# Patient Record
Sex: MALE | URBAN - METROPOLITAN AREA
[De-identification: names, ages, dates, MRNs, and addresses within clinical notes are randomized per-mention and may not be internally consistent; named-entity substitution may affect disease eponyms.]

---

## 2023-01-11 ENCOUNTER — LAB REQUISITION (OUTPATIENT)
Dept: LAB | Facility: CLINIC | Age: 48
End: 2023-01-11
Payer: COMMERCIAL

## 2023-01-11 DIAGNOSIS — I10 ESSENTIAL (PRIMARY) HYPERTENSION: ICD-10-CM

## 2023-01-11 DIAGNOSIS — Z00.00 ENCOUNTER FOR GENERAL ADULT MEDICAL EXAMINATION WITHOUT ABNORMAL FINDINGS: ICD-10-CM

## 2023-01-11 LAB
ALBUMIN SERPL BCG-MCNC: 4.5 G/DL (ref 3.5–5.2)
ALP SERPL-CCNC: 108 U/L (ref 40–129)
ALT SERPL W P-5'-P-CCNC: 33 U/L (ref 10–50)
ANION GAP SERPL CALCULATED.3IONS-SCNC: 18 MMOL/L (ref 7–15)
AST SERPL W P-5'-P-CCNC: 27 U/L (ref 10–50)
BILIRUB SERPL-MCNC: 0.4 MG/DL
BUN SERPL-MCNC: 8.5 MG/DL (ref 6–20)
CALCIUM SERPL-MCNC: 9.4 MG/DL (ref 8.6–10)
CHLORIDE SERPL-SCNC: 99 MMOL/L (ref 98–107)
CHOLEST SERPL-MCNC: 201 MG/DL
CREAT SERPL-MCNC: 0.69 MG/DL (ref 0.67–1.17)
DEPRECATED HCO3 PLAS-SCNC: 23 MMOL/L (ref 22–29)
GFR SERPL CREATININE-BSD FRML MDRD: >90 ML/MIN/1.73M2
GLUCOSE SERPL-MCNC: 108 MG/DL (ref 70–99)
HDLC SERPL-MCNC: 46 MG/DL
LDLC SERPL CALC-MCNC: 142 MG/DL
NONHDLC SERPL-MCNC: 155 MG/DL
POTASSIUM SERPL-SCNC: 4.4 MMOL/L (ref 3.4–5.3)
PROT SERPL-MCNC: 8 G/DL (ref 6.4–8.3)
SODIUM SERPL-SCNC: 140 MMOL/L (ref 136–145)
TRIGL SERPL-MCNC: 65 MG/DL
TSH SERPL DL<=0.005 MIU/L-ACNC: 1.57 UIU/ML (ref 0.3–4.2)

## 2023-01-11 PROCEDURE — 80053 COMPREHEN METABOLIC PANEL: CPT | Mod: ORL | Performed by: INTERNAL MEDICINE

## 2023-01-11 PROCEDURE — 87340 HEPATITIS B SURFACE AG IA: CPT | Mod: ORL | Performed by: INTERNAL MEDICINE

## 2023-01-11 PROCEDURE — 84443 ASSAY THYROID STIM HORMONE: CPT | Mod: ORL | Performed by: INTERNAL MEDICINE

## 2023-01-11 PROCEDURE — 86706 HEP B SURFACE ANTIBODY: CPT | Mod: ORL | Performed by: INTERNAL MEDICINE

## 2023-01-11 PROCEDURE — 80061 LIPID PANEL: CPT | Mod: ORL | Performed by: INTERNAL MEDICINE

## 2023-01-12 ENCOUNTER — TRANSCRIBE ORDERS (OUTPATIENT)
Dept: OTHER | Age: 48
End: 2023-01-12

## 2023-01-12 DIAGNOSIS — H53.8 BLURRY VISION, BILATERAL: Primary | ICD-10-CM

## 2023-01-12 LAB
HBV SURFACE AB SERPL IA-ACNC: 0.59 M[IU]/ML
HBV SURFACE AB SERPL IA-ACNC: NONREACTIVE M[IU]/ML
HBV SURFACE AG SERPL QL IA: NONREACTIVE

## 2023-01-23 ENCOUNTER — LAB REQUISITION (OUTPATIENT)
Dept: LAB | Facility: CLINIC | Age: 48
End: 2023-01-23
Payer: COMMERCIAL

## 2023-01-23 DIAGNOSIS — I10 ESSENTIAL (PRIMARY) HYPERTENSION: ICD-10-CM

## 2023-01-23 LAB
ANION GAP SERPL CALCULATED.3IONS-SCNC: 16 MMOL/L (ref 7–15)
BUN SERPL-MCNC: 11.1 MG/DL (ref 6–20)
CALCIUM SERPL-MCNC: 8.9 MG/DL (ref 8.6–10)
CHLORIDE SERPL-SCNC: 103 MMOL/L (ref 98–107)
CREAT SERPL-MCNC: 0.57 MG/DL (ref 0.67–1.17)
CREAT UR-MCNC: 90.7 MG/DL
DEPRECATED HCO3 PLAS-SCNC: 20 MMOL/L (ref 22–29)
GFR SERPL CREATININE-BSD FRML MDRD: >90 ML/MIN/1.73M2
GLUCOSE SERPL-MCNC: 106 MG/DL (ref 70–99)
MICROALBUMIN UR-MCNC: 28.3 MG/L
MICROALBUMIN/CREAT UR: 31.2 MG/G CR (ref 0–17)
POTASSIUM SERPL-SCNC: 3.8 MMOL/L (ref 3.4–5.3)
SODIUM SERPL-SCNC: 139 MMOL/L (ref 136–145)

## 2023-01-23 PROCEDURE — 82570 ASSAY OF URINE CREATININE: CPT | Mod: ORL | Performed by: INTERNAL MEDICINE

## 2023-01-23 PROCEDURE — 80048 BASIC METABOLIC PNL TOTAL CA: CPT | Mod: ORL | Performed by: INTERNAL MEDICINE

## 2023-01-26 ENCOUNTER — TRANSCRIBE ORDERS (OUTPATIENT)
Dept: OTHER | Age: 48
End: 2023-01-26

## 2023-01-26 DIAGNOSIS — Z12.11 SCREENING FOR COLON CANCER: Primary | ICD-10-CM

## 2023-04-11 ENCOUNTER — HOSPITAL ENCOUNTER (OUTPATIENT)
Facility: AMBULATORY SURGERY CENTER | Age: 48
End: 2023-04-11
Attending: SURGERY | Admitting: SURGERY
Payer: COMMERCIAL

## 2023-04-11 ENCOUNTER — TELEPHONE (OUTPATIENT)
Dept: GASTROENTEROLOGY | Facility: CLINIC | Age: 48
End: 2023-04-11
Payer: COMMERCIAL

## 2023-04-11 NOTE — TELEPHONE ENCOUNTER
Screening Questions  BLUE  KIND OF PREP RED  LOCATION [review exclusion criteria] GREEN  SEDATION TYPE        N - MAILED Are you active on mychart?       ALDAIR BOLAND Ordering/Referring Provider?        [employer] What type of coverage do you have?      N Have you had a positive covid test in the last 14 days?     32.9 1. BMI  [BMI 40+ - review exclusion criteria]    Y  2. Are you able to give consent for your medical care? [IF NO,RN REVIEW]          N  3. Are you taking any prescription pain medications on a routine schedule   (ex narcotics: oxycodone, roxicodone, oxycontin,  and percocet)? [RN Review]          3a. EXTENDED PREP What kind of prescription?     N 4. Do you have any chemical dependencies such as alcohol, street drugs, or methadone?        **If yes 3- 5 , please schedule with MAC sedation.**          IF YES TO ANY 6 - 10 - HOSPITAL SETTING ONLY.     N 6.   Do you need assistance transferring?     N 7.   Have you had a heart or lung transplant?    N 8.   Are you currently on dialysis?   N 9.   Do you use daily home oxygen?   N 10. Do you take nitroglycerin?   10a.  If yes, how often?     11. [FEMALES]   Are you currently pregnant?    11a.  If yes, how many weeks? [ Greater than 12 weeks, OR NEEDED]    N 12. Do you have Pulmonary Hypertension? *NEED PAC APPT AT [facility] w/ MAC*     N 13. [review exclusion criteria]  Do you have any implantable devices in your body (pacemaker, defib, LVAD)?    N 14. In the past 6 months, have you had any heart related issues including cardiomyopathy or heart attack?     14a. If yes, did it require cardiac stenting if so when?     N 15. Have you had a stroke or Transient ischemic attack (TIA - aka  mini stroke ) within 6 months?      N 16. Do you have mod to severe Obstructive Sleep Apnea?  [Hospital only]    N 17. Do you have SEVERE AND UNCONTROLLED asthma? *NEED PAC APPT AT [facility] w/MAC*     18. Are you currently taking any blood thinners?     18a. No. Continue to 19.   18b.  "Yes/no Blood Thinner: No. Inform patient to \"follow up w/ ordering provider for bridging instructions.\"    N 19. Do you take the medication Phentermine?    19a. If yes, \"Hold for 7 days before procedure.  Please consult your prescribing provider if you have questions about holding this medication.\"     N  20. Do you have chronic kidney disease?      N  21. Do you have a diagnosis of diabetes?     N  22. On a regular basis do you go 3-5 days between bowel movements?      23. Preferred LOCAL Pharmacy for Pre Prescription    [ LIST ONLY ONE PHARMACY]     [id number] - [place], [place] - [address] AT [facility] [address]    - CLOSING REMINDERS -    Informed patient they will need an adult    Cannot take any type of public or medical transportation alone    Conscious Sedation- Needs  for 6 hours after the procedure       MAC/General-Needs  for 24 hours after procedure    Pre-Procedure [facility] test [facility] be [facility]]    Confirmed Nurse will call to complete assessment       - SCHEDULING DETAILS -  NO Hospital Setting Required? If yes, what is the exclusion?:    LOUISE  Surgeon    6/9/2023  Date of Procedure  Lower Endoscopy [Colonoscopy]  Type of Procedure Scheduled  [facility] Location   [facility]- If you answer yes to questions #8, #20, #21 [  pts ]Which Colonoscopy Prep was Sent?   PRE-DIABETIC  MAC Sedation Type     NO PAC / Pre-op Required                 "

## 2023-05-24 ENCOUNTER — TELEPHONE (OUTPATIENT)
Dept: GASTROENTEROLOGY | Facility: CLINIC | Age: 48
End: 2023-05-24

## 2023-05-24 DIAGNOSIS — Z12.11 SCREENING FOR COLON CANCER: Primary | ICD-10-CM

## 2023-05-24 RX ORDER — BISACODYL 5 MG/1
TABLET, DELAYED RELEASE ORAL
Qty: 4 TABLET | Refills: 0 | Status: SHIPPED | OUTPATIENT
Start: 2023-05-24

## 2023-05-24 NOTE — TELEPHONE ENCOUNTER
Attempted to contact patient via  ID [id number] regarding upcoming Colonoscopy  procedure on 6/9/2023 for pre assessment questions. Pt was busy(talking to others in background)  and unable to talk at this time. Advised pt that we would call him back.     Yadira Silveira RN  Endoscopy Procedure Pre Assessment RN

## 2023-05-24 NOTE — TELEPHONE ENCOUNTER
Patient scheduled for Colonoscopy  on 6/9/2023.     Discuss Covid policy.     Pre op exam needed? N/A    Arrival time: 1145. Procedure time 1245    [facility]; [address] [place] [address], [address] Floor, [place] [address]    Sedation type: MAC    NSAIDs? RN to verify    Anticoagulations? No    Electronic implanted devices? No    Diabetic? No    Indication for procedure: Screening for colon cancer    Bowel prep recommendation: Standard Golytely     Prep instructions sent via [employer]     Bowel prep script sent to [facility] #[id number] - [place], [place] - [address] AT [facility] [address]    Pre visit planning completed.    Yadira Silveira RN  Endoscopy Procedure Pre Assessment RN

## 2023-05-30 RX ORDER — ONDANSETRON 2 MG/ML
4 INJECTION INTRAMUSCULAR; INTRAVENOUS ONCE
Status: CANCELLED | OUTPATIENT
Start: 2023-05-30 | End: 2023-05-30

## 2023-05-30 RX ORDER — ACETAMINOPHEN 325 MG/1
975 TABLET ORAL ONCE
Status: CANCELLED | OUTPATIENT
Start: 2023-05-30 | End: 2023-05-30

## 2023-06-02 NOTE — TELEPHONE ENCOUNTER
Pre-assessment not fully completed - the call got disconnected - called pt back and he answered and then call was dropped or pt hung up.    Just need to review NPO 2 hr's prior and reminder of no red or purple on clear liquids.      Pre assessment questions completed for upcoming Colonoscopy  procedure scheduled on 6/9/23    via  ID [id number]    COVID policy reviewed.     Reviewed procedural arrival time 1145, procedure time 1245 and facility location [facility]; [address] [place] [address], [address] Floor, [place] [address]    Designated  policy reviewed. Instructed to have someone stay 24 hours post procedure.     NSAIDs? No    Reviewed procedure prep instructions.      Prep instructions sent via Email.    Pt requests  communication via unencrypted e-mail. Pt acknowledges that they have agreed to accept the risks and receive unencrypted communications for this incidence.   [email or web address]    Avsi Oropeza RN  Endoscopy Procedure Pre Assessment RN

## 2023-06-07 ENCOUNTER — APPOINTMENT (OUTPATIENT)
Dept: INTERPRETER SERVICES | Facility: CLINIC | Age: 48
End: 2023-06-07
Payer: COMMERCIAL

## 2023-06-07 NOTE — TELEPHONE ENCOUNTER
Pre assessment questions completed for upcoming Colonoscopy  procedure scheduled on 6/9/2023.    Writer reviewed NPO 2 hrs prior to arrival time and reminded pt of no red or purple with clear liquids.    Yadira Silveira RN  Endoscopy Procedure Pre Assessment RN

## 2023-06-09 ENCOUNTER — HOSPITAL ENCOUNTER (OUTPATIENT)
Facility: AMBULATORY SURGERY CENTER | Age: 48
End: 2023-06-09
Attending: SURGERY | Admitting: SURGERY
Payer: COMMERCIAL

## 2023-06-09 ENCOUNTER — APPOINTMENT (OUTPATIENT)
Dept: INTERPRETER SERVICES | Facility: CLINIC | Age: 48
End: 2023-06-09
Payer: COMMERCIAL

## 2023-06-09 RX ORDER — LISINOPRIL AND HYDROCHLOROTHIAZIDE 20; 25 MG/1; MG/1
1 TABLET ORAL DAILY
COMMUNITY
Start: 2023-01-23 | End: 2023-06-09 | Stop reason: HOSPADM

## 2023-06-09 RX ORDER — AMLODIPINE BESYLATE 5 MG/1
1 TABLET ORAL DAILY
COMMUNITY
Start: 2023-02-15 | End: 2023-06-09 | Stop reason: HOSPADM

## 2023-06-09 RX ORDER — CLOTRIMAZOLE 1 %
CREAM (GRAM) TOPICAL
COMMUNITY
Start: 2023-01-23 | End: 2023-06-09 | Stop reason: HOSPADM

## 2023-08-09 RX ORDER — BISACODYL 5 MG/1
TABLET, DELAYED RELEASE ORAL
Qty: 4 TABLET | Refills: 0 | Status: SHIPPED | OUTPATIENT
Start: 2023-08-09

## 2023-08-09 NOTE — TELEPHONE ENCOUNTER
Rescheduled colonoscopy     ID [id number]    Pre assessment completed for upcoming procedure.      Procedure details:    Patient scheduled for Colonoscopy  on 8.25.23.     Arrival time: 1315. Procedure time 1415    Pre op exam needed? N/A    [facility]; [address] [place] [address], [address] Floor, [place] [address]    Sedation type: MAC    Indication for procedure: screening     COVID policy reviewed.    Designated  policy reviewed. Instructed to have someone stay 24 hours post procedure.       Chart review:     Electronic implanted devices? No    Diabetic? No    Medication review:    Anticoagulants? No    NSAIDS? No    Other medication HOLDING recommendations:  N/A      Prep for procedure:     Bowel prep recommendation: Standard Golytely    Due to: standard bowel prep.    Prep instructions sent via letter Bowel prep script sent to [facility] #[id number] - [place], [place] - [address] AT [facility] [address]    Reviewed procedure prep instructions.     Pt requests colonoscopy prep communication via unencrypted e-mail.  Pt acknowledges that they have agreed to accept the risks and receive unencrypted communications for this incidence.   Email sent to [email or web address]     Patient verbalized understanding and had no questions or concerns at this time.        Krysten Zamudio RN  Endoscopy Procedure Pre Assessment RN  [phone] option 4